# Patient Record
(demographics unavailable — no encounter records)

---

## 2025-06-06 NOTE — HISTORY OF PRESENT ILLNESS
[FreeTextEntry1] : Ms. STOREY 70 year old female presents today to re-establish care. Accompanied by her spouse. Formerly seen by Dr. Peres in 2019 for tremulousness and suspected PD on Sinemet TID. Since his last visit with Dr. Peres was being seen by a movement disorder center in [place] but due to location would like to transfer care back here. There have not been medication adjustments in the interim.   She reports increased tremors in right hand and right leg, jaw tremors at night. Symptoms have worsened over the past 5-6 months, requiring increased dosage and frequency of Carbidopa-Levodopa. Patient now takes 3-5 doses daily, with each dose lasting 3-4 hours. She reports when she takes a Sinemet may provide w relief for about 4 hours but would not get fully adequate relief as when she takes two tablets. She reports that depending on her tasks may require two tablets. She still works as a dental assistant 1x per week and has to revolve her dosing around her shifts. She experiences postural instability, freezing of gait, and reports instances where she has found herself needing to go down while using the stairs. Reports she feels a pulling sensation while walking as if something is pulling her back. Patient reports slow and quiet speech, stiffness, bone pain, and difficulty with fine motor tasks like writing and dressing. Buttons are ok. Sleep is disturbed, with only 3-4 hours per night and frequent awakenings. Talks in her sleep.  Some days will find she takes a dose at 6 am when she wakes up and by 8-9 am will feel she needs another dose.  No cognitive reporting today.  Reports poor penmanship.

## 2025-06-06 NOTE — ASSESSMENT
[FreeTextEntry1] : Ms. STOREY 70 year old female presents to re-establish care regarding presumed PD.   Reports worsening Parkinsonism over the last 6 mos, including increased tremors, postural instability, and sleep disturbances. Current medication regimen is becoming less effective in managing symptoms, and most days is taking up to 5 doses of Sinemet per day.   Will adjust medication regimen to Sinemet  mg 1.5 tablets TID and add Sinemet  mg ER nightly at bedtime  -Sinemet  mg 1.5 tablets TID  -Sinemet  mg ER at bedtime -PT, OT, Speech therapy  -Educated on disease progression  -RTC 3 mo

## 2025-06-06 NOTE — PHYSICAL EXAM
[FreeTextEntry1] : Mental status: Awake, alert and oriented x3.  Follows commands  No dysarthria, no aphasia.  Fund of knowledge appropriate.   Cranial nerves: Pupils equally round and reactive to light, visual fields full, no nystagmus, extraocular muscles intact, V1 through V3 intact bilaterally and symmetric, face symmetric, hearing intact to finger rub, palate elevation symmetric, tongue was midline.  Motor:  MRC grading 5/5 b/l UE/LE.   strength 5/5.    Sensation: Intact to light touch, temp, vibration, pp   Coordination: No dysmetria on finger-to-nose  Reflexes: 2+ in bilateral UE/LE ____________________________________________________________________ Mvt Exam: Reports taking 2 sinemet doses at 1000  -Face is not masked -Voice is slow and hypophonic  -EOM intact but slow and not smooth -No extension tremor, jaw tremor appreciated on the right, mild RUE tremor -No significant rigidity, cogwheeling noted in RUE -RUE and RLE bradykinesias -Stooped posture, intermittently shuffling, poor arm swing, mild off balance intermittent, retropulsion negative.